# Patient Record
(demographics unavailable — no encounter records)

---

## 2024-11-05 NOTE — HISTORY OF PRESENT ILLNESS
[de-identified] : This is very nice 73-year-old female experiencing right leg pain, which is severe in intensity. The pain substantially limits activities of daily living. Walking tolerance is reduced.  She ambulates with a walker.  She has a foot drop on the right leg.  Most of her pain is in the back.  She has known lumbar stenosis.  Some mild pain in the groin.  The patient denies any radiation of the pain to the feet and it is not associated with numbness, tingling, or weakness.

## 2024-11-05 NOTE — PHYSICAL EXAM
[de-identified] : Patient is well nourished, well-developed, in no acute distress, with appropriate mood and affect. The patient is oriented to time, place, and person. Respirations are even and unlabored. Gait evaluation does reveal a limp. There is no inguinal adenopathy. Examination of the contralateral hip shows normal range of motion, strength, no tenderness, and intact skin. The affected limb is well-perfused and showed 2+ dp/pt pulses, without skin lesions, shows a grossly normal motor and sensory examination. Examination of the hip shows no skin lesions. Hip motion is full and painless from 0-90 degrees extension to flexion, 20 degrees adduction and 20 degrees abduction, and 15 degrees internal and 30 degrees external rotation. Leg lengths are approximately equal. FADIR is positive at the extreme and CATHRYN is negative. Stinchfield test is negative. Both hips are stable and muscle strength is normal with good strength with resisted abduction and adduction.  She exhibits 0-5 strength to dorsiflexion and 3-5 strength to EHL.  Pedal pulses are palpable.  [de-identified] : AP pelvis, AP and lateral hip radiographs of the right hip were brought in by the patient which I reviewed and demonstrate mild degenerative joint disease of the hip with joint space narrowing, osteophyte formation, and subchondral sclerosis.

## 2024-11-05 NOTE — DISCUSSION/SUMMARY
[de-identified] : This patient has mild right hip osteoarthritis.  However the majority of her pain symptoms seem to be extra-articular related to her known lumbar radiculopathy and spinal stenosis.  The patient is not an appropriate candidate for surgical intervention at this time. An extensive discussion was conducted on the natural history of the disease and the variety of surgical and non-surgical options available to the patient including, but not limited to non-steroidal anti-inflammatory medications, steroid injections, physical therapy, maintenance of ideal body weight, and reduction of activity.  I prescribed a right intra-articular cortisone injection to be performed by interventional radiology.  She will keep a pain diary to determine how much of the pain improves with this injection.  I have also referred her to my spine surgery partner given the fact that she is developing a foot drop on the right leg.  Continue to use the walker. The patient will schedule an appointment as needed.

## 2024-11-14 NOTE — ADDENDUM
[FreeTextEntry1] : This note was written by Josh Keith on 11/14/2024 acting as scribe for Dr. Jay Sol M.D.  I, Jay Sol MD, have read and attest that all the information, medical decision making and discharge instructions within are true and accurate.

## 2024-11-14 NOTE — DISCUSSION/SUMMARY
[Medication Risks Reviewed] : Medication risks reviewed [Surgical risks reviewed] : Surgical risks reviewed [de-identified] : L4-5 spondylolisthesis with severe stenosis. Right L5 radiculopathy. Right foot drop present for 6 weeks. Discussed all options. Referred to Dr. Ronn Arias/Dr. Silva/Dr. Gonsalez and Dr. Marcos Sheth for pain management. All options discussed including rest, medicine, home exercise, acupuncture, Chiropractic care, Physical Therapy, Pain management, and last resort surgery. All questions were answered, all alternatives discussed, and the patient is in complete agreement with the treatment plan which the patient contributed to and discussed with me through the shared decision-making process. Follow-up appointment as instructed. Any issues and the patient will call or come in sooner. Patient returns with her MRI images.  She has severe spinal stenosis and spinal stenosis.  She is here with her daughter in attendance and another daughter on the phone.  Recommending surgery as she has significant weakness in the right foot but at this point all of them are refusing surgery I would like to try an epidural injection she was fitted for a brace for her right foot weakness and should go for the injection. Risks of surgery include infection, dural tear, nerve root injury, reherniation, future leg pain, future back pain, retained fragment, hematoma, urinary retention, worsening leg symptoms, foot drop, anesthetic risks, blood transfusion risks, positioning pain, visceral vascular injury, deep vein thrombosis, pulmonary embolus, and death. Somatosensory evoked potentials and EMG monitoring will be used. Surgery will involve lumbar decompression and fusion with or without instrumentation, local auto bone fusion, demineralized bone matrix, and neural monitoring. Patient will need spinal orthosis pre and post operatively. All risks were explained not exclusive to the ones mentioned alternatives were discussed and all questions were answered the patient agrees and understands the above and is in complete agreement with the plan.  Daughter-in-law listened through phone entire visit. Family members agree with plan.

## 2024-11-14 NOTE — HISTORY OF PRESENT ILLNESS
[Stable] : stable [de-identified] : 73 year old female presents for initial evaluation of right leg pain since September. Denies injury.  She had initially seen Dr. Ochoa last week for this as well, was found to have mild right hip arthritis.  She has pain that radiates down the RLE to the foot, with numbness/tingling. Most of her pain and numbness is at the right lateral calf.  She states that she slaps her right foot against the ground with walking, and has a right foot drop.  She states that her right leg feels weak. She takes tramadol prescribed by her pain management physician Dr. Grace. She also takes NSAIDs for the pain. Has not tried PT or chiropractic care. She is scheduled for an right hip intra-atrticular injection later today with radiology. PMHx: HTN, Graves disease, osteoporosis, s/p left hip THR in 2020 with Dr. Rohit Morales  No fever, chills, sweats, nausea/vomiting. No bowel or bladder dysfunction, no recent weight loss or gain. No night pain. This history is in addition to the intake form that I personally reviewed.

## 2024-11-14 NOTE — PHYSICAL EXAM
[Normal] : Gait: normal [Morton's Sign] : negative Morton's sign [Pronator Drift] : negative pronator drift [SLR] : negative straight leg raise [de-identified] : 5 out of 5 motor strength, sensation is intact and symmetrical full range of motion flexion extension and rotation, no palpatory tenderness full range of motion of hips knees shoulders and elbows (all four extremities), no atrophy, negative straight leg raise, no pathological reflexes, no swelling, normal ambulation, no apparent distress skin is intact, no palpable lymph nodes, no upper or lower extremity instability, alert and oriented x3 and normal mood. Normal finger-to nose test.  No upper motor neuron findings. Right tibialis anterior weakness 1/5. [de-identified] : XR AP Lat Lumbar 11/14/2024 -L4-5 spondylolisthesis- reviewed with patient.   Lumbar MRI 1022/2024 ([facility]) L4-5 severe stenosis with spondylolisthesis Lumbar disc degenerative disease Left lateral synovial cyst at L1-2 L5-S1 broad based disc bulge, left lateral recess and foraminal stenosis. Impingement left S1 and exiting left L5 nerve roots.

## 2024-12-02 NOTE — PHYSICAL EXAM
[Normal] : Gait: normal [Morton's Sign] : negative Morton's sign [Pronator Drift] : negative pronator drift [SLR] : negative straight leg raise [de-identified] : 5 out of 5 motor strength, sensation is intact and symmetrical full range of motion flexion extension and rotation, no palpatory tenderness full range of motion of hips knees shoulders and elbows (all four extremities), no atrophy, negative straight leg raise, no pathological reflexes, no swelling, normal ambulation, no apparent distress skin is intact, no palpable lymph nodes, no upper or lower extremity instability, alert and oriented x3 and normal mood. Normal finger-to nose test.  No upper motor neuron findings. Right tibialis anterior weakness 2-3/5. [de-identified] : XR AP Lat Lumbar 11/14/2024 -L4-5 spondylolisthesis- reviewed with patient.   Lumbar MRI 1022/2024 ([facility]) L4-5 severe stenosis with spondylolisthesis Lumbar disc degenerative disease Left lateral synovial cyst at L1-2 L5-S1 broad based disc bulge, left lateral recess and foraminal stenosis. Impingement left S1 and exiting left L5 nerve roots.

## 2024-12-02 NOTE — ADDENDUM
[FreeTextEntry1] : This note was written by Josh Keith on 12/02/2024 acting as scribe for Dr. Jay Sol M.D.  I, Jay Sol MD, have read and attest that all the information, medical decision making and discharge instructions within are true and accurate.

## 2024-12-02 NOTE — DISCUSSION/SUMMARY
[Medication Risks Reviewed] : Medication risks reviewed [Surgical risks reviewed] : Surgical risks reviewed [de-identified] : L4-5 spondylolisthesis with severe stenosis. Right L5 radiculopathy. Right foot drop present for 6 weeks- she is starting to improve. Discussed all options. Feeling much better after right hip injection and LAUREL by Dr Grace. Continue seeing Dr. Grace. She is still refusing surgery. Recommending L4-5 laminectomy and fusion as she has significant weakness in the right foot but at this point all of them are refusing surgery. Risks of surgery include infection, dural tear, nerve root injury, reherniation, future leg pain, future back pain, retained fragment, hematoma, urinary retention, worsening leg symptoms, foot drop, anesthetic risks, blood transfusion risks, positioning pain, visceral vascular injury, deep vein thrombosis, pulmonary embolus, and death. Somatosensory evoked potentials and EMG monitoring will be used. Surgery will involve lumbar decompression and fusion with or without instrumentation, local auto bone fusion, demineralized bone matrix, and neural monitoring. Patient will need spinal orthosis pre and post operatively. All risks were explained not exclusive to the ones mentioned alternatives were discussed and all questions were answered the patient agrees and understands the above and is in complete agreement with the plan.  All options discussed including rest, medicine, home exercise, acupuncture, Chiropractic care, Physical Therapy, Pain management, and last resort surgery. All questions were answered, all alternatives discussed, and the patient is in complete agreement with the treatment plan which the patient contributed to and discussed with me through the shared decision-making process. Follow-up appointment as instructed. Any issues and the patient will call or come in sooner.

## 2024-12-02 NOTE — HISTORY OF PRESENT ILLNESS
[Stable] : stable [de-identified] : 73 year old female presents for follow up evaluation of right leg pain since September. Denies injury.  She had initially seen Dr. Ochoa last week for this as well, was found to have mild right hip arthritis.  She has pain that radiates down the RLE to the foot, with numbness/tingling. Most of her pain and numbness is at the right lateral calf.  She states that she slaps her right foot against the ground with walking, and has a right foot drop.  She states that her right leg feels weak. She takes tramadol prescribed by her pain management physician Dr. Grace. She also takes NSAIDs for the pain. Has not tried PT or chiropractic care. S/P right hip intra-articular injection on 11/14/24 which helped. Was referred to pain management at last visit. She is S/P LESI x 1 on 11/20/24 with Dr. Grace and is feeling improvement. She notes that her pain level is 0/10. She also takes gabapentin which helps. She notes that her right foot drop has slightly improved, she notes that she is able to slightly lift her right toes.  PMHx: HTN, Graves disease, osteoporosis, s/p left hip THR in 2020 with Dr. Rohit Morales  No fever, chills, sweats, nausea/vomiting. No bowel or bladder dysfunction, no recent weight loss or gain. No night pain. This history is in addition to the intake form that I personally reviewed.

## 2025-03-17 NOTE — DISCUSSION/SUMMARY
[Medication Risks Reviewed] : Medication risks reviewed [Surgical risks reviewed] : Surgical risks reviewed [de-identified] : L4-5 spondylolisthesis with severe stenosis. Right L5 radiculopathy. Right foot weakness about 2-3 out of 5.  She does have some pain with range of motion of her right hip and she feels most of the pain is stemming from her hip although the hip replacement surgeon did not think she needed a hip replacement. Discussed all options. Pelvic MRI referral. F/U after MRI. She is still refusing surgery for her back. Recommending L4-5 laminectomy and fusion as she has significant weakness in the right foot but at this point all of them are refusing surgery. Risks of surgery include infection, dural tear, nerve root injury, reherniation, future leg pain, future back pain, retained fragment, hematoma, urinary retention, worsening leg symptoms, foot drop, anesthetic risks, blood transfusion risks, positioning pain, visceral vascular injury, deep vein thrombosis, pulmonary embolus, and death. Somatosensory evoked potentials and EMG monitoring will be used. Surgery will involve lumbar decompression and fusion with or without instrumentation, local auto bone fusion, demineralized bone matrix, and neural monitoring. Patient will need spinal orthosis pre and post operatively. All risks were explained not exclusive to the ones mentioned alternatives were discussed and all questions were answered the patient agrees and understands the above and is in complete agreement with the plan.  All options discussed including rest, medicine, home exercise, acupuncture, Chiropractic care, Physical Therapy, Pain management, and last resort surgery. All questions were answered, all alternatives discussed, and the patient is in complete agreement with the treatment plan which the patient contributed to and discussed with me through the shared decision-making process. Follow-up appointment as instructed. Any issues and the patient will call or come in sooner. Family member agrees with plan.

## 2025-03-17 NOTE — HISTORY OF PRESENT ILLNESS
[Stable] : stable [de-identified] : 73 year old female presents for follow up evaluation of right leg pain since September. Denies injury.  She had initially seen Dr. Ochoa last week for this as well, was found to have mild right hip arthritis.  She has pain that radiates down the RLE to the foot, with numbness/tingling. Most of her pain and numbness is at the right lateral calf.  She states that she slaps her right foot against the ground with walking, and has a right foot drop.  She states that her right leg feels weak. She takes tramadol prescribed by her pain management physician Dr. Grace. She also takes NSAIDs for the pain. Has not tried PT or chiropractic care. S/P right hip intra-articular injection on 11/14/24 which helped. She is S/P LESI x 1 on 11/20/24 with Dr. Grace and is feeling improvement. She notes that her pain level is 0/10. She also takes gabapentin which helps. She notes that her right foot drop has slightly improved, she notes that she is able to slightly lift her right toes.  She underwent a transforaminal LAUREL on 2/26/25. She notes that her pain has improved. She notes she has no pain currently, but has increasing pain/tiredness with activities. She complains of pain at the right lateral buttock.  PMHx: HTN, Graves disease, osteoporosis, s/p left hip THR in 2020 with Dr. Rohit Morales  No fever, chills, sweats, nausea/vomiting. No bowel or bladder dysfunction, no recent weight loss or gain. No night pain. This history is in addition to the intake form that I personally reviewed.

## 2025-03-17 NOTE — ADDENDUM
[FreeTextEntry1] : This note was written by Josh Keith on 03/17/2025 acting as scribe for Dr. Jay Sol M.D.  I, Jay Sol MD, have read and attest that all the information, medical decision making and discharge instructions within are true and accurate.

## 2025-03-17 NOTE — PHYSICAL EXAM
[Normal] : Gait: normal [Morton's Sign] : negative Morton's sign [Pronator Drift] : negative pronator drift [SLR] : negative straight leg raise [de-identified] : 5 out of 5 motor strength, sensation is intact and symmetrical full range of motion flexion extension and rotation, no palpatory tenderness full range of motion of hips knees shoulders and elbows (all four extremities), no atrophy, negative straight leg raise, no pathological reflexes, no swelling, normal ambulation, no apparent distress skin is intact, no palpable lymph nodes, no upper or lower extremity instability, alert and oriented x3 and normal mood. Normal finger-to nose test.  No upper motor neuron findings. Right tibialis anterior weakness 2-3/5. [de-identified] : XR AP Lat Lumbar 11/14/2024 -L4-5 spondylolisthesis- reviewed with patient.   I reviewed, interpreted and clinically correlated the following outside imaging studies,  Lumbar MRI 1022/2024 ([facility]) L4-5 severe stenosis with spondylolisthesis Lumbar disc degenerative disease Left lateral synovial cyst at L1-2 L5-S1 broad based disc bulge, left lateral recess and foraminal stenosis. Impingement left S1 and exiting left L5 nerve roots.

## 2025-04-02 NOTE — REASON FOR VISIT
[Home] : at home, [unfilled] , at the time of the visit. [Medical Office: ([facility])___] : at the medical office located in  [Telehealth (audio & video)] : This visit was provided via telehealth using real-time 2-way audio visual technology. [Verbal consent obtained from patient] : the patient, [unfilled] [Follow-Up Visit] : a follow-up visit for

## 2025-04-02 NOTE — REASON FOR VISIT
[Home] : at home, [unfilled] , at the time of the visit. [Medical Office: ([facility])___] : at the medical office located in  [Telehealth (audio & video)] : This visit was provided via telehealth using real-time 2-way audio visual technology. [Verbal consent obtained from patient] : the patient, [unfilled] [Follow-Up Visit] : a follow-up visit for

## 2025-04-02 NOTE — REASON FOR VISIT
[Home] : at home, [unfilled] , at the time of the visit. [Medical Office: ([facility])___] : at the medical office located in  [Telehealth (audio & video)] : This visit was provided via telehealth using real-time 2-way audio visual technology. [Verbal consent obtained from patient] : the patient, [unfilled] [Follow-Up Visit] : a follow-up visit for

## 2025-04-03 NOTE — PHYSICAL EXAM
[Normal] : Gait: normal [Morton's Sign] : negative Morton's sign [Pronator Drift] : negative pronator drift [SLR] : negative straight leg raise [de-identified] : 5 out of 5 motor strength, sensation is intact and symmetrical full range of motion flexion extension and rotation, no palpatory tenderness full range of motion of hips knees shoulders and elbows (all four extremities), no atrophy, negative straight leg raise, no pathological reflexes, no swelling, normal ambulation, no apparent distress skin is intact, no palpable lymph nodes, no upper or lower extremity instability, alert and oriented x3 and normal mood. Normal finger-to nose test.  No upper motor neuron findings. Right tibialis anterior weakness 2-3/5. [de-identified] : MRI Pelvis 3/25/25  ([facility])  IMPRESSION:   1. Advanced lower lumbar degenerative disc changes are present, incompletely evaluated on this study.  At least moderate and possibly severe canal stenosis at the L4-L5 level is thought present.  Dedicated MRI of the lumbar spine is recommended if there is sufficient clinical concern to warrant further assessment.   2. Moderate RIGHT hip osteoarthritis.  Superior glenoid labral tear with small adjacent paralabral cyst.   3. 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   4. Bilateral advanced chronic gluteal insertional tendinopathy with high-grade near-complete chronic tearing of the gluteus minimus tendons and intermediate grade partial-thickness tearing of the gluteus medius tendons.  Associated gluteal muscle atrophy.  Trochanteric bursitis noted bilaterally.   5. Total hip arthroplasty on the LEFT with no periprosthetic marrow signal abnormalities or other findings of implant complication.   6. Mild RIGHT and moderate LEFT hamstring origin tendinopathy without high-grade tear.   END OF IMPRESSION       MRI BONY PELVIS WITHOUT IV CONTRAST   HISTORY: Hip pain.  Lumbar radiculopathy.   TECHNIQUE: Multiplanar, multisequential images were obtained according to standard protocol.   IV CONTRAST ADMINISTRATION:  No intravenous contrast was administered   COMPARISON STUDIES:  No relevant prior examinations available for comparison   FINDINGS:   A total hip arthroplasty is seen on the LEFT is susceptibility artifact related to the prior surgical intervention.  This limits the assessment to some degree.   Imaging includes portions of the lumbar spine which demonstrated advanced degenerative disc disease at lower lumbar levels.  There is grade 1 anterolisthesis at L4-L5 with findings of lower lumbar facet arthritis.  At least moderate and possibly severe canal stenosis at the L4-L5 level is thought present, but is incompletely evaluated here.   Alignment of the sacroiliac joints appears normal.  There is mild periarticular irregularity of bone and slight signal alteration at the sacroiliac joints bilaterally, LEFT greater than RIGHT, in keeping with mild to moderate sacroiliac osteoarthritis.  There is also narrowing and irregularity of bone with mild T2 signal hyperintensity at the pubic symphysis in keeping with moderate degenerative changes.   Assessment of the RIGHT hip shows a moderate degree of articular cartilage loss particularly at the superior aspect of the hip articulation.  There is no significant periarticular marrow edema.  There is a conspicuous tear of the superior acetabular labrum on the RIGHT with a small 7 mm adjacent paralabral cyst best seen on coronal imaging (series 7, image 15).  No hip joint effusion.  Also on the RIGHT, there are findings of advanced gluteal insertional tendinopathy with high-grade near-complete chronic tearing of the gluteus minimus tendon and intermediate grade partial-thickness tearing of the gluteus medius.  Trochanteric enthesophyte formation is present.  There is peritendinous T2 signal hyperintensity with trochanteric bursal fluid present.  There is advanced fatty atrophy of the gluteus minimus and mild to moderate fatty atrophy of the gluteus medius muscle bellies on the RIGHT.   At the contralateral LEFT hip, a total hip arthroplasty device is seen.  Allowing for the effects of susceptibility artifact, there is no evident periprosthetic marrow signal abnormality.  No bone erosion is evident.  No periprosthetic fracture is seen.  There is a near full-thickness chronic disruption of the gluteus minimus tendon and intermediate grade partial tear of the gluteus medius tendon insertion at the greater trochanter.  Trochanteric bursal fluid is noted.  There is advanced fatty atrophy of the gluteus minimus and mild to moderate fatty atrophy of the gluteus medius muscles on the LEFT.   There is mild RIGHT and moderate LEFT hamstring origin tendinopathy without high-grade myotendinous disruption.  Iliopsoas tendons are intact.  No adductor tendon tear is seen.   There are no additional pathologic fluid collections identified within the imaged field of view.  The pelvic visceral contents have a normal appearance aside from a 2 cm uterine fibroid tumor again scattered colonic diverticula.  No findings of adenopathy or vascular thrombosis.   Electronic Signature: I personally reviewed the images and agree with this report. Final Report: Dictated by  and Signed by Attending Ministerio Boykin MD 3/25/2025 9:36 AM    XR AP Lat Lumbar 11/14/2024 -L4-5 spondylolisthesis- reviewed with patient.   I reviewed, interpreted and clinically correlated the following outside imaging studies,  Lumbar MRI 1022/2024 ([facility]) L4-5 severe stenosis with spondylolisthesis Lumbar disc degenerative disease Left lateral synovial cyst at L1-2 L5-S1 broad based disc bulge, left lateral recess and foraminal stenosis. Impingement left S1 and exiting left L5 nerve roots.

## 2025-04-03 NOTE — PHYSICAL EXAM
[Normal] : Gait: normal [Morton's Sign] : negative Morton's sign [Pronator Drift] : negative pronator drift [SLR] : negative straight leg raise [de-identified] : 5 out of 5 motor strength, sensation is intact and symmetrical full range of motion flexion extension and rotation, no palpatory tenderness full range of motion of hips knees shoulders and elbows (all four extremities), no atrophy, negative straight leg raise, no pathological reflexes, no swelling, normal ambulation, no apparent distress skin is intact, no palpable lymph nodes, no upper or lower extremity instability, alert and oriented x3 and normal mood. Normal finger-to nose test.  No upper motor neuron findings. Right tibialis anterior weakness 2-3/5. [de-identified] : MRI Pelvis 3/25/25  ([facility])  IMPRESSION:   1. Advanced lower lumbar degenerative disc changes are present, incompletely evaluated on this study.  At least moderate and possibly severe canal stenosis at the L4-L5 level is thought present.  Dedicated MRI of the lumbar spine is recommended if there is sufficient clinical concern to warrant further assessment.   2. Moderate RIGHT hip osteoarthritis.  Superior glenoid labral tear with small adjacent paralabral cyst.   3. 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   4. Bilateral advanced chronic gluteal insertional tendinopathy with high-grade near-complete chronic tearing of the gluteus minimus tendons and intermediate grade partial-thickness tearing of the gluteus medius tendons.  Associated gluteal muscle atrophy.  Trochanteric bursitis noted bilaterally.   5. Total hip arthroplasty on the LEFT with no periprosthetic marrow signal abnormalities or other findings of implant complication.   6. Mild RIGHT and moderate LEFT hamstring origin tendinopathy without high-grade tear.   END OF IMPRESSION       MRI BONY PELVIS WITHOUT IV CONTRAST   HISTORY: Hip pain.  Lumbar radiculopathy.   TECHNIQUE: Multiplanar, multisequential images were obtained according to standard protocol.   IV CONTRAST ADMINISTRATION:  No intravenous contrast was administered   COMPARISON STUDIES:  No relevant prior examinations available for comparison   FINDINGS:   A total hip arthroplasty is seen on the LEFT is susceptibility artifact related to the prior surgical intervention.  This limits the assessment to some degree.   Imaging includes portions of the lumbar spine which demonstrated advanced degenerative disc disease at lower lumbar levels.  There is grade 1 anterolisthesis at L4-L5 with findings of lower lumbar facet arthritis.  At least moderate and possibly severe canal stenosis at the L4-L5 level is thought present, but is incompletely evaluated here.   Alignment of the sacroiliac joints appears normal.  There is mild periarticular irregularity of bone and slight signal alteration at the sacroiliac joints bilaterally, LEFT greater than RIGHT, in keeping with mild to moderate sacroiliac osteoarthritis.  There is also narrowing and irregularity of bone with mild T2 signal hyperintensity at the pubic symphysis in keeping with moderate degenerative changes.   Assessment of the RIGHT hip shows a moderate degree of articular cartilage loss particularly at the superior aspect of the hip articulation.  There is no significant periarticular marrow edema.  There is a conspicuous tear of the superior acetabular labrum on the RIGHT with a small 7 mm adjacent paralabral cyst best seen on coronal imaging (series 7, image 15).  No hip joint effusion.  Also on the RIGHT, there are findings of advanced gluteal insertional tendinopathy with high-grade near-complete chronic tearing of the gluteus minimus tendon and intermediate grade partial-thickness tearing of the gluteus medius.  Trochanteric enthesophyte formation is present.  There is peritendinous T2 signal hyperintensity with trochanteric bursal fluid present.  There is advanced fatty atrophy of the gluteus minimus and mild to moderate fatty atrophy of the gluteus medius muscle bellies on the RIGHT.   At the contralateral LEFT hip, a total hip arthroplasty device is seen.  Allowing for the effects of susceptibility artifact, there is no evident periprosthetic marrow signal abnormality.  No bone erosion is evident.  No periprosthetic fracture is seen.  There is a near full-thickness chronic disruption of the gluteus minimus tendon and intermediate grade partial tear of the gluteus medius tendon insertion at the greater trochanter.  Trochanteric bursal fluid is noted.  There is advanced fatty atrophy of the gluteus minimus and mild to moderate fatty atrophy of the gluteus medius muscles on the LEFT.   There is mild RIGHT and moderate LEFT hamstring origin tendinopathy without high-grade myotendinous disruption.  Iliopsoas tendons are intact.  No adductor tendon tear is seen.   There are no additional pathologic fluid collections identified within the imaged field of view.  The pelvic visceral contents have a normal appearance aside from a 2 cm uterine fibroid tumor again scattered colonic diverticula.  No findings of adenopathy or vascular thrombosis.   Electronic Signature: I personally reviewed the images and agree with this report. Final Report: Dictated by  and Signed by Attending Ministerio Boykin MD 3/25/2025 9:36 AM    XR AP Lat Lumbar 11/14/2024 -L4-5 spondylolisthesis- reviewed with patient.   I reviewed, interpreted and clinically correlated the following outside imaging studies,  Lumbar MRI 1022/2024 ([facility]) L4-5 severe stenosis with spondylolisthesis Lumbar disc degenerative disease Left lateral synovial cyst at L1-2 L5-S1 broad based disc bulge, left lateral recess and foraminal stenosis. Impingement left S1 and exiting left L5 nerve roots.

## 2025-04-03 NOTE — DISCUSSION/SUMMARY
[Medication Risks Reviewed] : Medication risks reviewed [Surgical risks reviewed] : Surgical risks reviewed [de-identified] : L4-5 spondylolisthesis with severe stenosis. Right L5 radiculopathy. Right foot weakness about 2-3 out of 5.  She does have some pain with range of motion of her right hip and she feels most of the pain is stemming from her hip although the hip replacement surgeon did not think she needed a hip replacement. Discussed all options. She is still refusing surgery for her back. Recommending L4-5 laminectomy and fusion as she has significant weakness in the right foot but at this point all of them are refusing surgery. Risks of surgery include infection, dural tear, nerve root injury, reherniation, future leg pain, future back pain, retained fragment, hematoma, urinary retention, worsening leg symptoms, foot drop, anesthetic risks, blood transfusion risks, positioning pain, visceral vascular injury, deep vein thrombosis, pulmonary embolus, and death. Somatosensory evoked potentials and EMG monitoring will be used. Surgery will involve lumbar decompression and fusion with or without instrumentation, local auto bone fusion, demineralized bone matrix, and neural monitoring. Patient will need spinal orthosis pre and post operatively. All risks were explained not exclusive to the ones mentioned alternatives were discussed and all questions were answered the patient agrees and understands the above and is in complete agreement with the plan.  All options discussed including rest, medicine, home exercise, acupuncture, Chiropractic care, Physical Therapy, Pain management, and last resort surgery. All questions were answered, all alternatives discussed, and the patient is in complete agreement with the treatment plan which the patient contributed to and discussed with me through the shared decision-making process. Follow-up appointment as instructed. Any issues and the patient will call or come in sooner. Family member agrees with plan.

## 2025-04-03 NOTE — HISTORY OF PRESENT ILLNESS
[Stable] : stable [de-identified] : 73 year old female presents for follow up evaluation of right leg pain since September. Denies injury.  She had initially seen Dr. Ochoa for this as well, was found to have mild right hip arthritis.  She has pain that radiates down the RLE to the foot, with numbness/tingling. Most of her pain and numbness is at the right lateral calf.  She states that she slaps her right foot against the ground with walking, and has a right foot drop.  She states that her right leg feels weak. She takes tramadol prescribed by her pain management physician Dr. Grace. She also takes NSAIDs for the pain. Has not tried PT or chiropractic care. S/P right hip intra-articular injection on 11/14/24 which helped. She is S/P LESI x 1 on 11/20/24 with Dr. Grace and is feeling improvement. She notes that her pain level is 0/10. She also takes gabapentin which helps. She notes that her right foot drop has slightly improved, she notes that she is able to slightly lift her right toes.  She underwent a transforaminal LAUREL on 2/26/25. She notes that her pain has improved. She notes she has no pain currently, but has increasing pain/tiredness with activities. She complains of pain at the right lateral buttock.  Presents today for MRI Pelvis review.  PMHx: HTN, Graves disease, osteoporosis, s/p left hip THR in 2020 with Dr. Rohit Morales  No fever, chills, sweats, nausea/vomiting. No bowel or bladder dysfunction, no recent weight loss or gain. No night pain. This history is in addition to the intake form that I personally reviewed.

## 2025-04-03 NOTE — DISCUSSION/SUMMARY
[Medication Risks Reviewed] : Medication risks reviewed [Surgical risks reviewed] : Surgical risks reviewed [de-identified] : L4-5 spondylolisthesis with severe stenosis. Right L5 radiculopathy. Right foot weakness about 2-3 out of 5.  She does have some pain with range of motion of her right hip and she feels most of the pain is stemming from her hip although the hip replacement surgeon did not think she needed a hip replacement. Discussed all options. She is still refusing surgery for her back. Recommending L4-5 laminectomy and fusion as she has significant weakness in the right foot but at this point all of them are refusing surgery. Risks of surgery include infection, dural tear, nerve root injury, reherniation, future leg pain, future back pain, retained fragment, hematoma, urinary retention, worsening leg symptoms, foot drop, anesthetic risks, blood transfusion risks, positioning pain, visceral vascular injury, deep vein thrombosis, pulmonary embolus, and death. Somatosensory evoked potentials and EMG monitoring will be used. Surgery will involve lumbar decompression and fusion with or without instrumentation, local auto bone fusion, demineralized bone matrix, and neural monitoring. Patient will need spinal orthosis pre and post operatively. All risks were explained not exclusive to the ones mentioned alternatives were discussed and all questions were answered the patient agrees and understands the above and is in complete agreement with the plan.  All options discussed including rest, medicine, home exercise, acupuncture, Chiropractic care, Physical Therapy, Pain management, and last resort surgery. All questions were answered, all alternatives discussed, and the patient is in complete agreement with the treatment plan which the patient contributed to and discussed with me through the shared decision-making process. Follow-up appointment as instructed. Any issues and the patient will call or come in sooner. Family member agrees with plan.

## 2025-04-03 NOTE — DISCUSSION/SUMMARY
[Medication Risks Reviewed] : Medication risks reviewed [Surgical risks reviewed] : Surgical risks reviewed [de-identified] : L4-5 spondylolisthesis with severe stenosis. Right L5 radiculopathy. Right foot weakness about 2-3 out of 5.  She does have some pain with range of motion of her right hip and she feels most of the pain is stemming from her hip although the hip replacement surgeon did not think she needed a hip replacement. Discussed all options. She is still refusing surgery for her back. Recommending L4-5 laminectomy and fusion as she has significant weakness in the right foot but at this point all of them are refusing surgery. Risks of surgery include infection, dural tear, nerve root injury, reherniation, future leg pain, future back pain, retained fragment, hematoma, urinary retention, worsening leg symptoms, foot drop, anesthetic risks, blood transfusion risks, positioning pain, visceral vascular injury, deep vein thrombosis, pulmonary embolus, and death. Somatosensory evoked potentials and EMG monitoring will be used. Surgery will involve lumbar decompression and fusion with or without instrumentation, local auto bone fusion, demineralized bone matrix, and neural monitoring. Patient will need spinal orthosis pre and post operatively. All risks were explained not exclusive to the ones mentioned alternatives were discussed and all questions were answered the patient agrees and understands the above and is in complete agreement with the plan.  All options discussed including rest, medicine, home exercise, acupuncture, Chiropractic care, Physical Therapy, Pain management, and last resort surgery. All questions were answered, all alternatives discussed, and the patient is in complete agreement with the treatment plan which the patient contributed to and discussed with me through the shared decision-making process. Follow-up appointment as instructed. Any issues and the patient will call or come in sooner. Family member agrees with plan.

## 2025-04-03 NOTE — HISTORY OF PRESENT ILLNESS
[Stable] : stable [de-identified] : 73 year old female presents for follow up evaluation of right leg pain since September. Denies injury.  She had initially seen Dr. Ochoa for this as well, was found to have mild right hip arthritis.  She has pain that radiates down the RLE to the foot, with numbness/tingling. Most of her pain and numbness is at the right lateral calf.  She states that she slaps her right foot against the ground with walking, and has a right foot drop.  She states that her right leg feels weak. She takes tramadol prescribed by her pain management physician Dr. Grace. She also takes NSAIDs for the pain. Has not tried PT or chiropractic care. S/P right hip intra-articular injection on 11/14/24 which helped. She is S/P LESI x 1 on 11/20/24 with Dr. Grace and is feeling improvement. She notes that her pain level is 0/10. She also takes gabapentin which helps. She notes that her right foot drop has slightly improved, she notes that she is able to slightly lift her right toes.  She underwent a transforaminal LAUREL on 2/26/25. She notes that her pain has improved. She notes she has no pain currently, but has increasing pain/tiredness with activities. She complains of pain at the right lateral buttock.  Presents today for MRI Pelvis review.  PMHx: HTN, Graves disease, osteoporosis, s/p left hip THR in 2020 with Dr. Rohit Morales  No fever, chills, sweats, nausea/vomiting. No bowel or bladder dysfunction, no recent weight loss or gain. No night pain. This history is in addition to the intake form that I personally reviewed.

## 2025-04-03 NOTE — HISTORY OF PRESENT ILLNESS
[Stable] : stable [de-identified] : 73 year old female presents for follow up evaluation of right leg pain since September. Denies injury.  She had initially seen Dr. Ochoa for this as well, was found to have mild right hip arthritis.  She has pain that radiates down the RLE to the foot, with numbness/tingling. Most of her pain and numbness is at the right lateral calf.  She states that she slaps her right foot against the ground with walking, and has a right foot drop.  She states that her right leg feels weak. She takes tramadol prescribed by her pain management physician Dr. Grace. She also takes NSAIDs for the pain. Has not tried PT or chiropractic care. S/P right hip intra-articular injection on 11/14/24 which helped. She is S/P LESI x 1 on 11/20/24 with Dr. Grace and is feeling improvement. She notes that her pain level is 0/10. She also takes gabapentin which helps. She notes that her right foot drop has slightly improved, she notes that she is able to slightly lift her right toes.  She underwent a transforaminal LAUREL on 2/26/25. She notes that her pain has improved. She notes she has no pain currently, but has increasing pain/tiredness with activities. She complains of pain at the right lateral buttock.  Presents today for MRI Pelvis review.  PMHx: HTN, Graves disease, osteoporosis, s/p left hip THR in 2020 with Dr. Rohit Morales  No fever, chills, sweats, nausea/vomiting. No bowel or bladder dysfunction, no recent weight loss or gain. No night pain. This history is in addition to the intake form that I personally reviewed.

## 2025-04-03 NOTE — PHYSICAL EXAM
[Normal] : Gait: normal [Morton's Sign] : negative Morton's sign [Pronator Drift] : negative pronator drift [SLR] : negative straight leg raise [de-identified] : 5 out of 5 motor strength, sensation is intact and symmetrical full range of motion flexion extension and rotation, no palpatory tenderness full range of motion of hips knees shoulders and elbows (all four extremities), no atrophy, negative straight leg raise, no pathological reflexes, no swelling, normal ambulation, no apparent distress skin is intact, no palpable lymph nodes, no upper or lower extremity instability, alert and oriented x3 and normal mood. Normal finger-to nose test.  No upper motor neuron findings. Right tibialis anterior weakness 2-3/5. [de-identified] : MRI Pelvis 3/25/25  ([facility])  IMPRESSION:   1. Advanced lower lumbar degenerative disc changes are present, incompletely evaluated on this study.  At least moderate and possibly severe canal stenosis at the L4-L5 level is thought present.  Dedicated MRI of the lumbar spine is recommended if there is sufficient clinical concern to warrant further assessment.   2. Moderate RIGHT hip osteoarthritis.  Superior glenoid labral tear with small adjacent paralabral cyst.   3. 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   4. Bilateral advanced chronic gluteal insertional tendinopathy with high-grade near-complete chronic tearing of the gluteus minimus tendons and intermediate grade partial-thickness tearing of the gluteus medius tendons.  Associated gluteal muscle atrophy.  Trochanteric bursitis noted bilaterally.   5. Total hip arthroplasty on the LEFT with no periprosthetic marrow signal abnormalities or other findings of implant complication.   6. Mild RIGHT and moderate LEFT hamstring origin tendinopathy without high-grade tear.   END OF IMPRESSION       MRI BONY PELVIS WITHOUT IV CONTRAST   HISTORY: Hip pain.  Lumbar radiculopathy.   TECHNIQUE: Multiplanar, multisequential images were obtained according to standard protocol.   IV CONTRAST ADMINISTRATION:  No intravenous contrast was administered   COMPARISON STUDIES:  No relevant prior examinations available for comparison   FINDINGS:   A total hip arthroplasty is seen on the LEFT is susceptibility artifact related to the prior surgical intervention.  This limits the assessment to some degree.   Imaging includes portions of the lumbar spine which demonstrated advanced degenerative disc disease at lower lumbar levels.  There is grade 1 anterolisthesis at L4-L5 with findings of lower lumbar facet arthritis.  At least moderate and possibly severe canal stenosis at the L4-L5 level is thought present, but is incompletely evaluated here.   Alignment of the sacroiliac joints appears normal.  There is mild periarticular irregularity of bone and slight signal alteration at the sacroiliac joints bilaterally, LEFT greater than RIGHT, in keeping with mild to moderate sacroiliac osteoarthritis.  There is also narrowing and irregularity of bone with mild T2 signal hyperintensity at the pubic symphysis in keeping with moderate degenerative changes.   Assessment of the RIGHT hip shows a moderate degree of articular cartilage loss particularly at the superior aspect of the hip articulation.  There is no significant periarticular marrow edema.  There is a conspicuous tear of the superior acetabular labrum on the RIGHT with a small 7 mm adjacent paralabral cyst best seen on coronal imaging (series 7, image 15).  No hip joint effusion.  Also on the RIGHT, there are findings of advanced gluteal insertional tendinopathy with high-grade near-complete chronic tearing of the gluteus minimus tendon and intermediate grade partial-thickness tearing of the gluteus medius.  Trochanteric enthesophyte formation is present.  There is peritendinous T2 signal hyperintensity with trochanteric bursal fluid present.  There is advanced fatty atrophy of the gluteus minimus and mild to moderate fatty atrophy of the gluteus medius muscle bellies on the RIGHT.   At the contralateral LEFT hip, a total hip arthroplasty device is seen.  Allowing for the effects of susceptibility artifact, there is no evident periprosthetic marrow signal abnormality.  No bone erosion is evident.  No periprosthetic fracture is seen.  There is a near full-thickness chronic disruption of the gluteus minimus tendon and intermediate grade partial tear of the gluteus medius tendon insertion at the greater trochanter.  Trochanteric bursal fluid is noted.  There is advanced fatty atrophy of the gluteus minimus and mild to moderate fatty atrophy of the gluteus medius muscles on the LEFT.   There is mild RIGHT and moderate LEFT hamstring origin tendinopathy without high-grade myotendinous disruption.  Iliopsoas tendons are intact.  No adductor tendon tear is seen.   There are no additional pathologic fluid collections identified within the imaged field of view.  The pelvic visceral contents have a normal appearance aside from a 2 cm uterine fibroid tumor again scattered colonic diverticula.  No findings of adenopathy or vascular thrombosis.   Electronic Signature: I personally reviewed the images and agree with this report. Final Report: Dictated by  and Signed by Attending Ministerio Boykin MD 3/25/2025 9:36 AM    XR AP Lat Lumbar 11/14/2024 -L4-5 spondylolisthesis- reviewed with patient.   I reviewed, interpreted and clinically correlated the following outside imaging studies,  Lumbar MRI 1022/2024 ([facility]) L4-5 severe stenosis with spondylolisthesis Lumbar disc degenerative disease Left lateral synovial cyst at L1-2 L5-S1 broad based disc bulge, left lateral recess and foraminal stenosis. Impingement left S1 and exiting left L5 nerve roots.

## 2025-05-09 NOTE — PHYSICAL EXAM
[de-identified] : Adequate motor strength, sensation is intact and symmetrical full range of motion flexion extension and rotation, full range of motion of hips knees shoulders and elbows (all four extremities), no atrophy, negative straight leg raise, no swelling, normal ambulation, no apparent distress skin is intact, no upper or lower extremity instability, alert and oriented x3 and normal mood. Normal finger-to nose test. Adequate heal walk and toe walk.  [de-identified] : Imaging: MRI Pelvis 3/25/25 ([facility]) IMPRESSION:  1. Advanced lower lumbar degenerative disc changes are present, incompletely evaluated on this study. At least moderate and possibly severe canal stenosis at the L4-L5 level is thought present. Dedicated MRI of the lumbar spine is recommended if there is sufficient clinical concern to warrant further assessment.  2. Moderate RIGHT hip osteoarthritis. Superior glenoid labral tear with small adjacent paralabral cyst.  3. 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  4. Bilateral advanced chronic gluteal insertional tendinopathy with high-grade near-complete chronic tearing of the gluteus minimus tendons and intermediate grade partial-thickness tearing of the gluteus medius tendons. Associated gluteal muscle atrophy. Trochanteric bursitis noted bilaterally.  5. Total hip arthroplasty on the LEFT with no periprosthetic marrow signal abnormalities or other findings of implant complication.  6. Mild RIGHT and moderate LEFT hamstring origin tendinopathy without high-grade tear.  END OF IMPRESSION    MRI BONY PELVIS WITHOUT IV CONTRAST  HISTORY: Hip pain. Lumbar radiculopathy.  TECHNIQUE: Multiplanar, multisequential images were obtained according to standard protocol.  IV CONTRAST ADMINISTRATION: No intravenous contrast was administered  COMPARISON STUDIES: No relevant prior examinations available for comparison  FINDINGS:  A total hip arthroplasty is seen on the LEFT is susceptibility artifact related to the prior surgical intervention. This limits the assessment to some degree.  Imaging includes portions of the lumbar spine which demonstrated advanced degenerative disc disease at lower lumbar levels. There is grade 1 anterolisthesis at L4-L5 with findings of lower lumbar facet arthritis. At least moderate and possibly severe canal stenosis at the L4-L5 level is thought present, but is incompletely evaluated here.  Alignment of the sacroiliac joints appears normal. There is mild periarticular irregularity of bone and slight signal alteration at the sacroiliac joints bilaterally, LEFT greater than RIGHT, in keeping with mild to moderate sacroiliac osteoarthritis. There is also narrowing and irregularity of bone with mild T2 signal hyperintensity at the pubic symphysis in keeping with moderate degenerative changes.  Assessment of the RIGHT hip shows a moderate degree of articular cartilage loss particularly at the superior aspect of the hip articulation. There is no significant periarticular marrow edema. There is a conspicuous tear of the superior acetabular labrum on the RIGHT with a small 7 mm adjacent paralabral cyst best seen on coronal imaging (series 7, image 15). No hip joint effusion. Also on the RIGHT, there are findings of advanced gluteal insertional tendinopathy with high-grade near-complete chronic tearing of the gluteus minimus tendon and intermediate grade partial-thickness tearing of the gluteus medius. Trochanteric enthesophyte formation is present. There is peritendinous T2 signal hyperintensity with trochanteric bursal fluid present. There is advanced fatty atrophy of the gluteus minimus and mild to moderate fatty atrophy of the gluteus medius muscle bellies on the RIGHT.  At the contralateral LEFT hip, a total hip arthroplasty device is seen. Allowing for the effects of susceptibility artifact, there is no evident periprosthetic marrow signal abnormality. No bone erosion is evident. No periprosthetic fracture is seen. There is a near full-thickness chronic disruption of the gluteus minimus tendon and intermediate grade partial tear of the gluteus medius tendon insertion at the greater trochanter. Trochanteric bursal fluid is noted. There is advanced fatty atrophy of the gluteus minimus and mild to moderate fatty atrophy of the gluteus medius muscles on the LEFT.  There is mild RIGHT and moderate LEFT hamstring origin tendinopathy without high-grade myotendinous disruption. Iliopsoas tendons are intact. No adductor tendon tear is seen.  There are no additional pathologic fluid collections identified within the imaged field of view. The pelvic visceral contents have a normal appearance aside from a 2 cm uterine fibroid tumor again scattered colonic diverticula. No findings of adenopathy or vascular thrombosis.  Electronic Signature: I personally reviewed the images and agree with this report. Final Report: Dictated by and Signed by Attending Ministerio Boykin MD 3/25/2025 9:36 AM    XR AP Lat Lumbar 11/14/2024 -L4-5 spondylolisthesis- reviewed with patient.  I reviewed, interpreted and clinically correlated the following outside imaging studies,  Lumbar MRI 1022/2024 ([facility]) L4-5 severe stenosis with spondylolisthesis Lumbar disc degenerative disease Left lateral synovial cyst at L1-2 L5-S1 broad based disc bulge, left lateral recess and foraminal stenosis. Impingement left S1 and exiting left L5 nerve roots.

## 2025-05-09 NOTE — DISCUSSION/SUMMARY
[Medication Risks Reviewed] : Medication risks reviewed [Surgical risks reviewed] : Surgical risks reviewed [de-identified] : Medication risks reviewed. Surgical risks reviewed. L4-5 spondylolisthesis with severe stenosis. Right L5 radiculopathy. Right foot weakness about 2-3 out of 5. She does have some pain with range of motion of her right hip and she feels most of the pain is stemming from her hip although the hip replacement surgeon did not think she needed a hip replacement. Discussed all options. Recommending L4-5 laminectomy and fusion as she has significant weakness in the right foot but at this point all of them are refusing surgery. Risks of surgery include infection, dural tear, nerve root injury, reherniation, future leg pain, future back pain, retained fragment, hematoma, urinary retention, worsening leg symptoms, foot drop, anesthetic risks, blood transfusion risks, positioning pain, visceral vascular injury, deep vein thrombosis, pulmonary embolus, and death. Somatosensory evoked potentials and EMG monitoring will be used. Surgery will involve lumbar decompression and fusion with or without instrumentation, local auto bone fusion, demineralized bone matrix, and neural monitoring. Patient will need spinal orthosis pre and post operatively. All risks were explained not exclusive to the ones mentioned alternatives were discussed and all questions were answered the patient agrees and understands the above and is in complete agreement with the plan. Surgery 6/10. Daughter on the phone. All options discussed including rest, medicine, home exercise, acupuncture, Chiropractic care, Physical Therapy, Pain management, and last resort surgery. All questions were answered, all alternatives discussed, and the patient is in complete agreement with the treatment plan which the patient contributed to and discussed with me through the shared decision-making process. Follow-up appointment as instructed. Any issues and the patient will call or come in sooner. Family member agrees with plan.

## 2025-05-09 NOTE — DISCUSSION/SUMMARY
[Medication Risks Reviewed] : Medication risks reviewed [Surgical risks reviewed] : Surgical risks reviewed [de-identified] : Medication risks reviewed. Surgical risks reviewed. L4-5 spondylolisthesis with severe stenosis. Right L5 radiculopathy. Right foot weakness about 2-3 out of 5. She does have some pain with range of motion of her right hip and she feels most of the pain is stemming from her hip although the hip replacement surgeon did not think she needed a hip replacement. Discussed all options. Recommending L4-5 laminectomy and fusion as she has significant weakness in the right foot but at this point all of them are refusing surgery. Risks of surgery include infection, dural tear, nerve root injury, reherniation, future leg pain, future back pain, retained fragment, hematoma, urinary retention, worsening leg symptoms, foot drop, anesthetic risks, blood transfusion risks, positioning pain, visceral vascular injury, deep vein thrombosis, pulmonary embolus, and death. Somatosensory evoked potentials and EMG monitoring will be used. Surgery will involve lumbar decompression and fusion with or without instrumentation, local auto bone fusion, demineralized bone matrix, and neural monitoring. Patient will need spinal orthosis pre and post operatively. All risks were explained not exclusive to the ones mentioned alternatives were discussed and all questions were answered the patient agrees and understands the above and is in complete agreement with the plan. Surgery 6/10. Daughter on the phone. All options discussed including rest, medicine, home exercise, acupuncture, Chiropractic care, Physical Therapy, Pain management, and last resort surgery. All questions were answered, all alternatives discussed, and the patient is in complete agreement with the treatment plan which the patient contributed to and discussed with me through the shared decision-making process. Follow-up appointment as instructed. Any issues and the patient will call or come in sooner. Family member agrees with plan.

## 2025-05-09 NOTE — HISTORY OF PRESENT ILLNESS
[Worsening] : worsening [de-identified] : 73 year old female presents for follow up evaluation of right leg pain since September. Denies injury. She had initially seen Dr. Ochoa for this as well, was found to have mild right hip arthritis. She has pain that radiates down the RLE to the foot, with numbness/tingling. Most of her pain and numbness is at the right lateral calf. She states that she slaps her right foot against the ground with walking, and has a right foot drop. She states that her right leg feels weak. She takes tramadol prescribed by her pain management physician Dr. Grace. She also takes NSAIDs for the pain. Has not tried PT or chiropractic care. S/P right hip intra-articular injection on 11/14/24 which helped. She is S/P LESI x 1 on 11/20/24 with Dr. Grace and is feeling improvement. She notes that her pain level is 0/10. She also takes gabapentin which helps. She notes that her right foot drop has slightly improved, she notes that she is able to slightly lift her right toes. She underwent a transforaminal LAUREL on 2/26/25. She notes that her pain has improved. She notes she has no pain currently, but has increasing pain/tiredness with activities. She complains of pain at the right lateral buttock. Presents today for MRI Pelvis review. PMHx: HTN, Graves disease, osteoporosis, s/p left hip THR in 2020 with Dr. Rohit Morales No fever, chills, sweats, nausea/vomiting. No bowel or bladder dysfunction, no recent weight loss or gain. No night pain. This history is in addition to the intake form that I personally reviewed.     She states the symptoms are stable.

## 2025-05-09 NOTE — HISTORY OF PRESENT ILLNESS
[Worsening] : worsening [de-identified] : 73 year old female presents for follow up evaluation of right leg pain since September. Denies injury. She had initially seen Dr. Ochoa for this as well, was found to have mild right hip arthritis. She has pain that radiates down the RLE to the foot, with numbness/tingling. Most of her pain and numbness is at the right lateral calf. She states that she slaps her right foot against the ground with walking, and has a right foot drop. She states that her right leg feels weak. She takes tramadol prescribed by her pain management physician Dr. Grace. She also takes NSAIDs for the pain. Has not tried PT or chiropractic care. S/P right hip intra-articular injection on 11/14/24 which helped. She is S/P LESI x 1 on 11/20/24 with Dr. Grace and is feeling improvement. She notes that her pain level is 0/10. She also takes gabapentin which helps. She notes that her right foot drop has slightly improved, she notes that she is able to slightly lift her right toes. She underwent a transforaminal LAUREL on 2/26/25. She notes that her pain has improved. She notes she has no pain currently, but has increasing pain/tiredness with activities. She complains of pain at the right lateral buttock. Presents today for MRI Pelvis review. PMHx: HTN, Graves disease, osteoporosis, s/p left hip THR in 2020 with Dr. Rohit Morales No fever, chills, sweats, nausea/vomiting. No bowel or bladder dysfunction, no recent weight loss or gain. No night pain. This history is in addition to the intake form that I personally reviewed.     She states the symptoms are stable.

## 2025-05-09 NOTE — PHYSICAL EXAM
[de-identified] : Adequate motor strength, sensation is intact and symmetrical full range of motion flexion extension and rotation, full range of motion of hips knees shoulders and elbows (all four extremities), no atrophy, negative straight leg raise, no swelling, normal ambulation, no apparent distress skin is intact, no upper or lower extremity instability, alert and oriented x3 and normal mood. Normal finger-to nose test. Adequate heal walk and toe walk.  [de-identified] : Imaging: MRI Pelvis 3/25/25 ([facility]) IMPRESSION:  1. Advanced lower lumbar degenerative disc changes are present, incompletely evaluated on this study. At least moderate and possibly severe canal stenosis at the L4-L5 level is thought present. Dedicated MRI of the lumbar spine is recommended if there is sufficient clinical concern to warrant further assessment.  2. Moderate RIGHT hip osteoarthritis. Superior glenoid labral tear with small adjacent paralabral cyst.  3. 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  4. Bilateral advanced chronic gluteal insertional tendinopathy with high-grade near-complete chronic tearing of the gluteus minimus tendons and intermediate grade partial-thickness tearing of the gluteus medius tendons. Associated gluteal muscle atrophy. Trochanteric bursitis noted bilaterally.  5. Total hip arthroplasty on the LEFT with no periprosthetic marrow signal abnormalities or other findings of implant complication.  6. Mild RIGHT and moderate LEFT hamstring origin tendinopathy without high-grade tear.  END OF IMPRESSION    MRI BONY PELVIS WITHOUT IV CONTRAST  HISTORY: Hip pain. Lumbar radiculopathy.  TECHNIQUE: Multiplanar, multisequential images were obtained according to standard protocol.  IV CONTRAST ADMINISTRATION: No intravenous contrast was administered  COMPARISON STUDIES: No relevant prior examinations available for comparison  FINDINGS:  A total hip arthroplasty is seen on the LEFT is susceptibility artifact related to the prior surgical intervention. This limits the assessment to some degree.  Imaging includes portions of the lumbar spine which demonstrated advanced degenerative disc disease at lower lumbar levels. There is grade 1 anterolisthesis at L4-L5 with findings of lower lumbar facet arthritis. At least moderate and possibly severe canal stenosis at the L4-L5 level is thought present, but is incompletely evaluated here.  Alignment of the sacroiliac joints appears normal. There is mild periarticular irregularity of bone and slight signal alteration at the sacroiliac joints bilaterally, LEFT greater than RIGHT, in keeping with mild to moderate sacroiliac osteoarthritis. There is also narrowing and irregularity of bone with mild T2 signal hyperintensity at the pubic symphysis in keeping with moderate degenerative changes.  Assessment of the RIGHT hip shows a moderate degree of articular cartilage loss particularly at the superior aspect of the hip articulation. There is no significant periarticular marrow edema. There is a conspicuous tear of the superior acetabular labrum on the RIGHT with a small 7 mm adjacent paralabral cyst best seen on coronal imaging (series 7, image 15). No hip joint effusion. Also on the RIGHT, there are findings of advanced gluteal insertional tendinopathy with high-grade near-complete chronic tearing of the gluteus minimus tendon and intermediate grade partial-thickness tearing of the gluteus medius. Trochanteric enthesophyte formation is present. There is peritendinous T2 signal hyperintensity with trochanteric bursal fluid present. There is advanced fatty atrophy of the gluteus minimus and mild to moderate fatty atrophy of the gluteus medius muscle bellies on the RIGHT.  At the contralateral LEFT hip, a total hip arthroplasty device is seen. Allowing for the effects of susceptibility artifact, there is no evident periprosthetic marrow signal abnormality. No bone erosion is evident. No periprosthetic fracture is seen. There is a near full-thickness chronic disruption of the gluteus minimus tendon and intermediate grade partial tear of the gluteus medius tendon insertion at the greater trochanter. Trochanteric bursal fluid is noted. There is advanced fatty atrophy of the gluteus minimus and mild to moderate fatty atrophy of the gluteus medius muscles on the LEFT.  There is mild RIGHT and moderate LEFT hamstring origin tendinopathy without high-grade myotendinous disruption. Iliopsoas tendons are intact. No adductor tendon tear is seen.  There are no additional pathologic fluid collections identified within the imaged field of view. The pelvic visceral contents have a normal appearance aside from a 2 cm uterine fibroid tumor again scattered colonic diverticula. No findings of adenopathy or vascular thrombosis.  Electronic Signature: I personally reviewed the images and agree with this report. Final Report: Dictated by and Signed by Attending Ministerio Boykin MD 3/25/2025 9:36 AM    XR AP Lat Lumbar 11/14/2024 -L4-5 spondylolisthesis- reviewed with patient.  I reviewed, interpreted and clinically correlated the following outside imaging studies,  Lumbar MRI 1022/2024 ([facility]) L4-5 severe stenosis with spondylolisthesis Lumbar disc degenerative disease Left lateral synovial cyst at L1-2 L5-S1 broad based disc bulge, left lateral recess and foraminal stenosis. Impingement left S1 and exiting left L5 nerve roots.

## 2025-05-09 NOTE — REASON FOR VISIT
[Other Location: e.g. School (Enter Location, City,State)___] : at [unfilled], at the time of the visit. [Other Location: e.g. Home (Enter Location, City,State)___] : at [unfilled] [Telehealth (audio & video)] : This visit was provided via telehealth using real-time 2-way audio visual technology.

## 2025-05-20 NOTE — REVIEW OF SYSTEMS
[Feeling Fatigued] : feeling fatigued [Negative] : Heme/Lymph [FreeTextEntry8] : post prandial bloating

## 2025-05-20 NOTE — ADDENDUM
[FreeTextEntry1] : Celio Yarbrough assisted in documentation on May 20 2025 acting as a scribe for Dr. Peter Reyes.

## 2025-05-20 NOTE — PHYSICAL EXAM
[General Appearance - Well Nourished] : well nourished [Normal Conjunctiva] : the conjunctiva exhibited no abnormalities [Normal Oral Mucosa] : normal oral mucosa [No Oral Pallor] : no oral pallor [No Oral Cyanosis] : no oral cyanosis [Normal Jugular Venous A Waves Present] : normal jugular venous A waves present [Normal Jugular Venous V Waves Present] : normal jugular venous V waves present [No Jugular Venous Correia A Waves] : no jugular venous correia A waves [Respiration, Rhythm And Depth] : normal respiratory rhythm and effort [Exaggerated Use Of Accessory Muscles For Inspiration] : no accessory muscle use [Auscultation Breath Sounds / Voice Sounds] : lungs were clear to auscultation bilaterally [Heart Rate And Rhythm] : heart rate and rhythm were normal [Heart Sounds] : normal S1 and S2 [Abdomen Soft] : soft [Abdomen Tenderness] : non-tender [Abdomen Mass (___ Cm)] : no abdominal mass palpated [Nail Clubbing] : no clubbing of the fingernails [Cyanosis, Localized] : no localized cyanosis [Petechial Hemorrhages (___cm)] : no petechial hemorrhages [Skin Color & Pigmentation] : normal skin color and pigmentation [] : no rash [No Venous Stasis] : no venous stasis [Skin Lesions] : no skin lesions [No Skin Ulcers] : no skin ulcer [No Xanthoma] : no  xanthoma was observed [Oriented To Time, Place, And Person] : oriented to person, place, and time [Affect] : the affect was normal [Mood] : the mood was normal [No Anxiety] : not feeling anxious [Rhythm Regular] : regular [Normal S1] : normal S1 [Normal S2] : normal S2 [II] : a grade 2 [No Pitting Edema] : no pitting edema present [Normal] : no edema, no cyanosis, no clubbing, no varicosities [FreeTextEntry1] : left knee mild swollen pain with minimal eversion or internal rotation. \par  \par   [Rt] : no varicose veins of the right leg [Lt] : no varicose veins of the left leg [de-identified] : pos ramos leo right sides

## 2025-05-20 NOTE — HISTORY OF PRESENT ILLNESS
[Preoperative Visit] : for a medical evaluation prior to surgery [Scheduled Procedure ___] : a [unfilled] [Surgeon Name ___] : surgeon: [unfilled] [Prior Anesthesia] : Prior anesthesia [Electrocardiogram] : ~T an ECG ~C was performed [Metabolic Capacity ___Mets%] : The patient has a metabolic capacity of [unfilled] Mets%  [Date of Surgery ___] : on [unfilled] [Fever] : no fever [Chills] : no chills [Fatigue] : no fatigue [Chest Pain] : no chest pain [Dyspnea] : no dyspnea [Dysuria] : no dysuria [Urinary Frequency] : no urinary frequency [Nausea] : no nausea [Diabetes] : no diabetes [Cardiovascular Disease] : no cardiovascular disease [Pulmonary Disease] : no pulmonary disease [Anti-Platelet Agents] : no anti-platelet agents [Nicotine Dependence] : no nicotine dependence [Prev Anesthesia Reaction] : no previous anesthesia reaction [de-identified] : Dr Sol  [FreeTextEntry1] : 09/29/22 here for HTN f/u  preop for eye cataract surgery  recnt Gi illness self resolved   02/13/23 preop for cataract surgery Denies Chest Pain, SOB, Dizziness, Leg edema, Orthopnea, PND, Palpitations, Syncope, ACOSTA, Diaphoresis.  11/02/2023 morning arrhythmia lasting 10 min at a time no sob or chest pain no dizziness  resolves spontaneously  occurs every morning x 2 week  chronic pruritis on back  mild wgt gain   1/8/24 recurrent cough and URI  no fever occ sweats rhinitis   4/1/24 preop for lid repair  no angina or acosta  7/10/24  pos vertogo x 3 days seen by ENT ears cleaned   5/20/25 Is preop for planned spinal surgery  has mod Right OA of HIP is preop for spinal surgery

## 2025-05-20 NOTE — DISCUSSION/SUMMARY
[Moderate Mitral Regurgitation] : moderate mitral regurgitation [Compensated] : compensated [Echocardiogram] : echocardiogram [Continue] : continuing angiotensin receptor blockers [Procedure Intermediate Risk] : the procedure risk is intermediate [Patient Low Risk] : the patient is a low surgical risk [Optimized for Surgery] : the patient is optimized for surgery [As per surgery] : as per surgery [FreeTextEntry1] : Lvef 55 MOD MR  Dilated LA

## 2025-05-20 NOTE — ASSESSMENT
[FreeTextEntry1] : Vertigo   Lifestyle changes for control of BP reviewed ie wgt reduction, salt restriction, Etoh avoidance, exercise 30 min aerobic activity per day, avoid NSAID use self monitor BP TIW Lifestyle advice for LDL reduction including reduction of red meat consumption concentrating on a plant based diet. 30 min aerobic exercise per day. Calorie reduction to target BMI<25

## 2025-07-02 NOTE — PHYSICAL EXAM
[Cane] : ambulates with cane [Walker] : ambulates with walker [Morton's Sign] : negative Morton's sign [Pronator Drift] : negative pronator drift [SLR] : negative straight leg raise [de-identified] : 4 out of 5 right tibialis otherwise 5 out of 5 motor strength, sensation is intact and symmetrical full range of motion flexion extension and rotation, no palpatory tenderness full range of motion of hips knees shoulders and elbows (all four extremities), no atrophy, negative straight leg raise, no pathological reflexes, no swelling, normal ambulation, no apparent distress skin is intact, no palpable lymph nodes, no upper or lower extremity instability, alert and oriented x3 and normal mood. Normal finger-to nose test.  Incision healing. Right foot strength is coming back. [de-identified] : XR AP Lat Lumbar 7/2/25 - L3-S1 laminectomy and L4-5 fusion - reviewed with patient.

## 2025-07-02 NOTE — HISTORY OF PRESENT ILLNESS
[Improving] : improving [de-identified] : 74 year old female presents s/p L3-S1 laminectomy and L4-5 fusion on 6/10/25.  Pre-operative leg pain has improved.  Doing well. Ambulating with a rollater.  She was in rehabilitation for 2 weeks and feels fantastic her foot drop on the right is significantly improved. Taking gabapentin 300 TID for the pain. Has some left lateral hip with radiation down the ankle, but that has improved.  Her right foot weakness has also improved.  No fever chills sweats nausea vomiting no bowel or bladder dysfunction, no recent weight loss or gain no night pain. This history is in addition to the intake form that I personally reviewed.

## 2025-07-02 NOTE — ADDENDUM
[FreeTextEntry1] : This note was written by Tlyer Briseno on 07/02/2025 acting as scribe for Dr. Jay Sol M.D. I, Jay Sol MD, have read and attest that all the information, medical decision making and discharge instructions within are true and accurate.

## 2025-07-02 NOTE — DISCUSSION/SUMMARY
[de-identified] : s/p L3-S1 laminectomy and L4-5 fusion on 6/10/25.  Discussed all options. She is doing fantastic and very happy with the surgical outcome. F/U in 1 month. Discussed activity modifications. Referral for outpatient therapy and at home therapy. All options discussed including rest, medicine, home exercise, acupuncture, Chiropractic care, Physical Therapy, Pain management, and last resort surgery. All questions were answered, all alternatives discussed and the patient is in complete agreement with the treatment plan which the patient contributed to and discussed with me through the shared decision making process. Follow-up appointment as instructed. Any issues and the patient will call or come in sooner.  Family member agrees with plan.

## 2025-07-03 NOTE — ADDENDUM
[FreeTextEntry1] : Celio Yarbrough assisted in documentation on Jul 3 2025 acting as a scribe for Dr. Peter Reyes.

## 2025-07-03 NOTE — HISTORY OF PRESENT ILLNESS
[Prior Anesthesia] : Prior anesthesia [Electrocardiogram] : ~T an ECG ~C was performed [Metabolic Capacity ___Mets%] : The patient has a metabolic capacity of [unfilled] Mets%  [FreeTextEntry1] : 09/29/22 here for HTN f/u  preop for eye cataract surgery  recnt Gi illness self resolved   02/13/23 preop for cataract surgery Denies Chest Pain, SOB, Dizziness, Leg edema, Orthopnea, PND, Palpitations, Syncope, ACOSTA, Diaphoresis.  11/02/2023 morning arrhythmia lasting 10 min at a time no sob or chest pain no dizziness  resolves spontaneously  occurs every morning x 2 week  chronic pruritis on back  mild wgt gain   1/8/24 recurrent cough and URI  no fever occ sweats rhinitis   4/1/24 preop for lid repair  no angina or acosta  7/10/24  pos vertogo x 3 days seen by ENT ears cleaned   5/20/25 Is preop for planned spinal surgery  has mod Right OA of HIP is preop for spinal surgery   7/3/25 recent spinal surgery  was in REHAB post op was placed on losartan 25 mg Bp was noted to 185 sys  No HA or dizziness or sscp  BP was 172 sys on d/c  went to 152 sys  was d/c 7/1/25    [Fever] : no fever [Chills] : no chills [Fatigue] : no fatigue [Chest Pain] : no chest pain [Dyspnea] : no dyspnea [Dysuria] : no dysuria [Urinary Frequency] : no urinary frequency [Nausea] : no nausea [Diabetes] : no diabetes [Cardiovascular Disease] : no cardiovascular disease [Pulmonary Disease] : no pulmonary disease [Anti-Platelet Agents] : no anti-platelet agents [Nicotine Dependence] : no nicotine dependence [Prev Anesthesia Reaction] : no previous anesthesia reaction [de-identified] : Dr Sol

## 2025-07-03 NOTE — DISCUSSION/SUMMARY
[Moderate Mitral Regurgitation] : moderate mitral regurgitation [Compensated] : compensated [Echocardiogram] : echocardiogram [Continue] : continuing angiotensin receptor blockers [Patient Low Risk] : the patient is a low surgical risk [FreeTextEntry1] : BP elev post op resume Losartan 100 qd  rehab to continue if BP remains high will add Norvasc 5 mg

## 2025-07-03 NOTE — PHYSICAL EXAM
[General Appearance - Well Nourished] : well nourished [Normal Conjunctiva] : the conjunctiva exhibited no abnormalities [Normal Oral Mucosa] : normal oral mucosa [No Oral Pallor] : no oral pallor [No Oral Cyanosis] : no oral cyanosis [Normal Jugular Venous A Waves Present] : normal jugular venous A waves present [Normal Jugular Venous V Waves Present] : normal jugular venous V waves present [No Jugular Venous Correia A Waves] : no jugular venous correia A waves [Respiration, Rhythm And Depth] : normal respiratory rhythm and effort [Exaggerated Use Of Accessory Muscles For Inspiration] : no accessory muscle use [Auscultation Breath Sounds / Voice Sounds] : lungs were clear to auscultation bilaterally [Heart Rate And Rhythm] : heart rate and rhythm were normal [Heart Sounds] : normal S1 and S2 [Abdomen Soft] : soft [Abdomen Tenderness] : non-tender [Abdomen Mass (___ Cm)] : no abdominal mass palpated [Nail Clubbing] : no clubbing of the fingernails [Cyanosis, Localized] : no localized cyanosis [Petechial Hemorrhages (___cm)] : no petechial hemorrhages [Skin Color & Pigmentation] : normal skin color and pigmentation [] : no rash [No Venous Stasis] : no venous stasis [Skin Lesions] : no skin lesions [No Skin Ulcers] : no skin ulcer [No Xanthoma] : no  xanthoma was observed [Oriented To Time, Place, And Person] : oriented to person, place, and time [Affect] : the affect was normal [Mood] : the mood was normal [No Anxiety] : not feeling anxious [Rhythm Regular] : regular [Normal S1] : normal S1 [Normal S2] : normal S2 [II] : a grade 2 [No Pitting Edema] : no pitting edema present [Normal] : no edema, no cyanosis, no clubbing, no varicosities [FreeTextEntry1] : healed spinal incision    [Rt] : no varicose veins of the right leg [Lt] : no varicose veins of the left leg [de-identified] : pos ramos leo right sides

## 2025-07-30 NOTE — ADDENDUM
[FreeTextEntry1] : This note was written by Tyler Briseno on 7/30/2025 acting as scribe for Dr. Jay Sol M.D. I, Jay Sol MD, have read and attest that all the information, medical decision making and discharge instructions within are true and accurate.

## 2025-07-30 NOTE — HISTORY OF PRESENT ILLNESS
[Improving] : improving [de-identified] : 74 year old female presents s/p L3-S1 laminectomy and L4-5 fusion on 6/10/25.  Pre-operative leg pain has improved.  Doing well. Ambulating with a rollater.  She was in rehabilitation for 2 weeks and feels fantastic her foot drop on the right is significantly improved. Taking gabapentin 300 TID for the pain. Has some left lateral hip with radiation down the ankle.  She takes tramadol and tylenol for the pain as well.  Her right foot weakness has also improved.  No fever chills sweats nausea vomiting no bowel or bladder dysfunction, no recent weight loss or gain no night pain. This history is in addition to the intake form that I personally reviewed.

## 2025-07-30 NOTE — DISCUSSION/SUMMARY
[de-identified] : s/p L3-S1 laminectomy and L4-5 fusion on 6/10/25.  Discussed all options. Medrol dosepak PRN. Referral for physical therapy (lumbar). Referral to neurologist, Dr. Luther, for workup. Will F/U in 2 months for XR. All options discussed including rest, medicine, home exercise, acupuncture, Chiropractic care, Physical Therapy, Pain management, and last resort surgery. All questions were answered, all alternatives discussed and the patient is in complete agreement with the treatment plan which the patient contributed to and discussed with me through the shared decision making process. Follow-up appointment as instructed. Any issues and the patient will call or come in sooner.  Family member agrees with plan.

## 2025-07-30 NOTE — PHYSICAL EXAM
[Cane] : ambulates with cane [Walker] : ambulates with walker [Morton's Sign] : negative Morton's sign [Pronator Drift] : negative pronator drift [SLR] : negative straight leg raise [de-identified] : 4 out of 5 right tibialis otherwise 5 out of 5 motor strength, sensation is intact and symmetrical full range of motion flexion extension and rotation, no palpatory tenderness full range of motion of hips knees shoulders and elbows (all four extremities), no atrophy, negative straight leg raise, no pathological reflexes, no swelling, normal ambulation, no apparent distress skin is intact, no palpable lymph nodes, no upper or lower extremity instability, alert and oriented x3 and normal mood. Normal finger-to nose test.  Incision healing. Right foot strength is coming back. [de-identified] : XR AP Lat Lumbar 07/30/2025 - L3-S1 laminectomy and L4-5 fusion -reviewed with patient.   XR AP Lat Lumbar 7/2/25 - L3-S1 laminectomy and L4-5 fusion - reviewed with patient.

## 2025-07-30 NOTE — PHYSICAL EXAM
[Cane] : ambulates with cane [Walker] : ambulates with walker [Morton's Sign] : negative Morton's sign [Pronator Drift] : negative pronator drift [SLR] : negative straight leg raise [de-identified] : 4 out of 5 right tibialis otherwise 5 out of 5 motor strength, sensation is intact and symmetrical full range of motion flexion extension and rotation, no palpatory tenderness full range of motion of hips knees shoulders and elbows (all four extremities), no atrophy, negative straight leg raise, no pathological reflexes, no swelling, normal ambulation, no apparent distress skin is intact, no palpable lymph nodes, no upper or lower extremity instability, alert and oriented x3 and normal mood. Normal finger-to nose test.  Incision healing. Right foot strength is coming back. [de-identified] : XR AP Lat Lumbar 07/30/2025 - L3-S1 laminectomy and L4-5 fusion -reviewed with patient.   XR AP Lat Lumbar 7/2/25 - L3-S1 laminectomy and L4-5 fusion - reviewed with patient.

## 2025-07-30 NOTE — DISCUSSION/SUMMARY
[de-identified] : s/p L3-S1 laminectomy and L4-5 fusion on 6/10/25.  Discussed all options. Medrol dosepak PRN. Referral for physical therapy (lumbar). Referral to neurologist, Dr. Luther, for workup. Will F/U in 2 months for XR. All options discussed including rest, medicine, home exercise, acupuncture, Chiropractic care, Physical Therapy, Pain management, and last resort surgery. All questions were answered, all alternatives discussed and the patient is in complete agreement with the treatment plan which the patient contributed to and discussed with me through the shared decision making process. Follow-up appointment as instructed. Any issues and the patient will call or come in sooner.  Family member agrees with plan.

## 2025-07-30 NOTE — HISTORY OF PRESENT ILLNESS
[Improving] : improving [de-identified] : 74 year old female presents s/p L3-S1 laminectomy and L4-5 fusion on 6/10/25.  Pre-operative leg pain has improved.  Doing well. Ambulating with a rollater.  She was in rehabilitation for 2 weeks and feels fantastic her foot drop on the right is significantly improved. Taking gabapentin 300 TID for the pain. Has some left lateral hip with radiation down the ankle.  She takes tramadol and tylenol for the pain as well.  Her right foot weakness has also improved.  No fever chills sweats nausea vomiting no bowel or bladder dysfunction, no recent weight loss or gain no night pain. This history is in addition to the intake form that I personally reviewed.